# Patient Record
Sex: FEMALE | Race: WHITE
[De-identification: names, ages, dates, MRNs, and addresses within clinical notes are randomized per-mention and may not be internally consistent; named-entity substitution may affect disease eponyms.]

---

## 2017-05-12 NOTE — EDPHY
H & P


Stated Complaint: RLQ abd pain





- Personal History


LMP (Females 10-55): 1-7 Days Ago


Current Tetanus/Diphtheria Vaccine: Yes


Current Tetanus Diphtheria and Acellular Pertussis (TDAP): Yes





- Medical/Surgical History


Hx Asthma: Yes


Hx Chronic Respiratory Disease: No


Hx Diabetes: No


Hx Cardiac Disease: No


Hx Renal Disease: No


Hx Cirrhosis: No


Hx Alcoholism: No


Hx HIV/AIDS: No


Hx Splenectomy or Spleen Trauma: No


Other PMH: asthma,





- Social History


Smoking Status: Never smoked


Time Seen by Provider: 05/11/17 23:43


HPI/ROS: 





Chief complaint:  Abdominal pain





History of present illness:  This is a 42-year-old female who presents to the 

emergency department for evaluation of abdominal pain.  Patient reports the 

onset of symptoms earlier this evening.  She reports pain in the right, upper 

aspect of the abdomen.  It does radiate towards her mid back.  She describes a 

squeezing pain.  She has had nausea but no vomiting.  She denies precipitating 

factors.  She denies alleviating factors.  She denies other associated signs or 

symptoms including no fevers, again no vomiting, no diarrhea, no urinary 

symptoms.





Review of systems:  A 10 point review of systems was obtained and other than 

described above was negative (Won Pelletier)





- Physical Exam


Exam: 





General Appearance:  Alert, nontoxic.


Eyes:  Pupils equal and round no pallor or injection.


ENT, Mouth:  Mucous membranes moist.


Respiratory:  There are no retractions, lungs are clear to auscultation.


Cardiovascular:  Regular rate and rhythm.


Gastrointestinal:  Bowel sounds normal.  Abdomen is soft, nondistended.  There 

is tenderness in the right upper quadrant with positive Clifton sign. No McBurney

's point tenderness.


Neurological:  Alert and oriented. Strength and sensation intact and 

symmetrical.


Skin:  Warm and dry, no rashes.


Musculoskeletal:  Neck is supple nontender. Extremities are symmetrical, full 

range of motion.


Psychiatric:  Patient is oriented X 3, there is no agitation.


 (Won Pelletier)


Constitutional: 


 Initial Vital Signs











Temperature (C)  36.6 C   05/11/17 23:35


 


Heart Rate  72   05/11/17 23:35


 


Respiratory Rate  18   05/11/17 23:35


 


Blood Pressure  131/82 H  05/11/17 23:35


 


O2 Sat (%)  99   05/11/17 23:35








 











O2 Delivery Mode               Room Air














Allergies/Adverse Reactions: 


 





Penicillins Allergy (Severe, Verified 05/11/17 23:34)


 Anaphylaxis








Home Medications: 














 Medication  Instructions  Recorded


 


Albuterol HFA 17g  01/15/11


 


Montelukast Sodium  05/11/17














Medical Decision Making





- Diagnostics


Imaging Results: 





Right upper quadrant ultrasound demonstrates multiple small dependent, mobile 

gallstones, no gallbladder wall thickening, sonographic Clifton's, 

pericholecystic fluid, dilated CBD, discussed with Dr. Coley of Radiology. (

Sunshine Holliday)


ED Course/Re-evaluation: 





Patient seen under the supervision of my secondary supervising physician Dr. Sunshine Byrd.  Patient presents to the emergency department for right 

upper quadrant abdominal pain.  She is tender in this region.  Blood studies 

concerning for mild elevated white blood cell count, mild elevation of LFTs and 

blood in the urine.  Right upper quadrant ultrasound is ordered and is pending 

at time of dictation.  Care of patient is turned over to my attending physician 

Dr. Sunshine Byrd end of shift. (Won Pelletier)


PHYSICIAN DOCUMENTATION:


The patient was evaluated and managed by the Physician Assistant.  My co-

signature indicates that I have reviewed this chart and I agree with the 

findings and plan of care as documented.  I am the secondary supervising 

physician.





I reassessed the patient after her ultrasound.  Her pain has improved 

significantly.  She has not had any vomiting.  On abdominal exam, she does not 

have any tenderness.  I reviewed her test results with her and her ultrasound.  

I explained cholelithiasis and cholecystitis to her.  She does have a 

leukocytosis and a mild transaminitis, however given resolution of her symptoms

, I do not feel she would benefit from inpatient hospitalization.  I have 

offered hospitalization to her, however she would like to go home.  I have 

instructed her to avoid greasy and fatty foods.  I have told her to take 

ibuprofen as needed for pain.  I have referred her to General surgery for 

further evaluation.  She is in agreement with this plan.  She was given a p. o. 

trial without any difficulty.





 (Sunshine Holliday)


Differential Diagnosis: 





Included but not limited to biliary tract disease, pancreatitis, peptic ulcer 

disease, gastroenteritis, appendicitis, urinary tract disease (Won Pelletier)





- Data Points


Laboratory Results: 


 Laboratory Results





 05/12/17 00:01 





 05/12/17 00:01 





 











  05/12/17 05/12/17 05/12/17





  00:01 00:01 00:01


 


WBC      13.08 10^3/uL H 10^3/uL





     (3.80-9.50) 


 


RBC      4.31 10^6/uL 10^6/uL





     (4.18-5.33) 


 


Hgb      12.8 g/dL g/dL





     (12.6-16.3) 


 


Hct      37.0 % L %





     (38.0-47.0) 


 


MCV      85.8 fL fL





     (81.5-99.8) 


 


MCH      29.7 pg pg





     (27.9-34.1) 


 


MCHC      34.6 g/dL g/dL





     (32.4-36.7) 


 


RDW      11.9 % %





     (11.5-15.2) 


 


Plt Count      462 10^3/uL H 10^3/uL





     (150-400) 


 


MPV      8.3 fL L fL





     (8.7-11.7) 


 


Neut % (Auto)      66.2 % %





     (39.3-74.2) 


 


Lymph % (Auto)      22.5 % %





     (15.0-45.0) 


 


Mono % (Auto)      7.5 % %





     (4.5-13.0) 


 


Eos % (Auto)      2.9 % %





     (0.6-7.6) 


 


Baso % (Auto)      0.5 % %





     (0.3-1.7) 


 


Nucleat RBC Rel Count      0.0 % %





     (0.0-0.2) 


 


Absolute Neuts (auto)      8.67 10^3/uL H 10^3/uL





     (1.70-6.50) 


 


Absolute Lymphs (auto)      2.94 10^3/uL 10^3/uL





     (1.00-3.00) 


 


Absolute Monos (auto)      0.98 10^3/uL H 10^3/uL





     (0.30-0.80) 


 


Absolute Eos (auto)      0.38 10^3/uL 10^3/uL





     (0.03-0.40) 


 


Absolute Basos (auto)      0.06 10^3/uL 10^3/uL





     (0.02-0.10) 


 


Absolute Nucleated RBC      0.00 10^3/uL 10^3/uL





     (0-0.01) 


 


Immature Gran %      0.4 % %





     (0.0-1.1) 


 


Immature Gran #      0.05 10^3/uL 10^3/uL





     (0.00-0.10) 


 


Sodium    141 mEq/L mEq/L  





    (134-144)  


 


Potassium    4.1 mEq/L mEq/L  





    (3.5-5.2)  


 


Chloride    106 mEq/L mEq/L  





    ()  


 


Carbon Dioxide    21 mEq/l L mEq/l  





    (22-31)  


 


Anion Gap    14 mEq/L mEq/L  





    (8-16)  


 


BUN    19 mg/dL mg/dL  





    (7-23)  


 


Creatinine    0.9 mg/dL mg/dL  





    (0.6-1.0)  


 


Estimated GFR    > 60   





    


 


Glucose    126 mg/dL H mg/dL  





    ()  


 


Calcium    9.7 mg/dL mg/dL  





    (8.5-10.4)  


 


Total Bilirubin    0.8 mg/dL mg/dL  





    (0.1-1.4)  


 


Conjugated Bilirubin    0.5 mg/dL mg/dL  





    (0.0-0.5)  


 


Unconjugated Bilirubin    0.3 mg/dL mg/dL  





    (0.0-1.1)  


 


AST    93 IU/L H IU/L  





    (14-46)  


 


ALT    63 IU/L H IU/L  





    (9-52)  


 


Alkaline Phosphatase    70 IU/L IU/L  





    ()  


 


Total Protein    7.3 g/dL g/dL  





    (6.3-8.2)  


 


Albumin    4.3 g/dL g/dL  





    (3.5-5.0)  


 


Lipase    101.0 IU/L IU/L  





    ()  


 


Beta HCG, Qual  NEGATIVE     





    











Medications Given: 


 








Discontinued Medications





Hydrocodone Bitart/Acetaminophen (Norco 5/325mg Prepack#6)  1 btl TAKEHOME 

EDNOW ONE


   Stop: 05/12/17 01:25


   Last Admin: 05/12/17 01:28 Dose:  1 btl


Hydromorphone HCl (Dilaudid)  0.5 mg IVP EDNOW ONE


   Stop: 05/11/17 23:52


   Last Admin: 05/12/17 00:09 Dose:  0.5 mg


Ondansetron HCl (Zofran)  4 mg IVP EDNOW ONE


   Stop: 05/11/17 23:52


   Last Admin: 05/12/17 00:09 Dose:  4 mg








Departure





- Departure


Disposition: Home, Routine, Self-Care


Clinical Impression: 


Cholelithiasis


Qualifiers:


 Cholelithiasis location: gallbladder Cholecystitis presence: without 

cholecystitis Biliary obstruction: without biliary obstruction Qualified Code(s)

: K80.20 - Calculus of gallbladder without cholecystitis without obstruction





Condition: Good


Instructions:  Hydrocodone/Acetaminophen (By mouth), Biliary Colic (ED)


Additional Instructions: 


Please avoid any fatty or greasy foods.





Please take the pain medication if your pain returns.  You can start with 

ibuprofen 400 mg every 6 hours, if the pain is more severe, you can take the 

pain pills.  I have given you information for the general surgeon, you should 

call to make an appointment.





If your pain is severe, if you have severe nausea or vomiting, or if you 

develop a fever you must return to the emergency room.


Referrals: 


Bekah Villanueva MD [Primary Care Provider] - As per Instructions


Tripp Weiner MD [Medical Doctor] - As per Instructions

## 2017-05-20 NOTE — GOP
[f rep st]



                                                                OPERATIVE REPORT





DATE OF OPERATION:  05/19/2017



SURGEON:  Tripp Weiner MD



ASSISTANT:  Elliot Gordillo, PAC.



ANESTHESIA:  General endotracheal per Arturo Treadwell M.D.



PREOPERATIVE DIAGNOSIS:  Biliary colic.



POSTOPERATIVE DIAGNOSIS:  Chronic cholecystitis.



PROCEDURE PERFORMED:  Laparoscopic cholecystectomy.



FINDINGS:  Fair amount of wall induration and some adhesions to the gallbladder wall were identified
.  A critical view was obtained prior to clipping both the cystic duct and artery.



SPECIMENS:  Gallbladder.



ESTIMATED BLOOD LOSS:  25 cc.



DESCRIPTION OF PROCEDURE:  The patient was greeted in the preoperative suite.  Once again, risks, be
nefits, and alternatives were discussed.  Consent was then signed.  She was then brought back to the
 operative suite, placed on the OR table in a supine position.  After all anesthesia machines, inclu
ding SCDs, were on and functioning, a World Health [employer] time-out was performed, ending with 
all in agreement.  Antibiotics were given on-call to the operating room. 



After successful induction of general anesthesia, the patient's abdomen was widely prepped and drape
d in typical sterile fashion.  I entered the abdomen via an inferior umbilical incision through whic
h the Veress needle was passed.  I insufflated with CO2 to 15 mmHg [facility] 



Through this same incision I inserted a 12 mm Visiport under direct visualization.  Once successfull
y in the abdomen, I placed 3 additional 5 mm trocars, 1 in the subxiphoid, 2 in the right upper quad
rant, all under direct visualization.  After this was done, I grasped the dome of the liver and retr
acted it successfully over the liver.  There were some peritoneal adhesions which were successfully 
taken down bluntly. 



After this was done, I turned my attention towards the infundibulum, and via a combination of electr
ocautery and blunt dissection, I identified 2 and only 2 structures, leading toward the gallbladder 
proper.  After I identified these to be both the cystic duct and artery, I 1st clipped and divided t
he cystic duct and did the same for the artery.  There was a small arterial branch posterior to this
 which did bleed somewhat after this, which was successfully made hemostatic with an additional clip
. 



After hemostasis was achieved,  I turned my attention towards removing the gallbladder off the liver
 bed which was done with electrocautery.  After successfully doing this, it was placed in an EndoCat
ch bag and removed.  I turned my attention back towards the right upper quadrant.  I irrigated with 
1 L of warm normal saline, removing all blood and bile within the patient's right upper quadrant. 



I then inspected my clips which were noted to be in satisfactory condition.  I then instilled local 
anesthesia into all port sites which were removed under direct visualization.  Prior to doing this, 
I used the Kevyn Alethea fascial closure device to close my infraumbilical incision, noting excell
ent fascial reapproximation with a single 0 Vicryl stitch. 



Ports were then removed and pneumoperitoneum was evacuated.  All skin sites were closed with a runni
ng 4-0 Monocryl stitch over which Dermabond was placed.  The patient tolerated the procedure well wi
thout any intraoperative complications.



DRAINS:  None.



COMPLICATIONS:  None.





Job #:  [id number]

## 2017-05-20 NOTE — GCON
[f 
rep st]



                                                                    CONSULTATION





DATE OF CONSULTATION:  05/20/2017



CHIEF COMPLAINT:  Incisional redness.



HISTORY OF PRESENT ILLNESS:  This is a 42-year-old female, postoperative day 1 
from a laparoscopic cholecystectomy performed by me yesterday as an outpatient.
  The patient states that she went home yesterday evening uneventfully healing 
from her surgery and doing well.  She states that over the night, she had no 
issues, but awoke this morning, and had some redness around her umbilical 
incision.  She does endorse that this got a little bit bigger today, which is 
why she called a nurse line here at [facility], and they prompted her to 
present to the emergency department.  Other than the redness around her 
umbilical incision, she has no other complaints.  She continues to tolerate a 
regular diet.  Has no fevers, chills, and her pain appears well controlled.  
She was just concerned about the redness at that site.



PAST MEDICAL HISTORY:  Asthma.



PAST SURGICAL HISTORY:  Postoperative day 1 from a laparoscopic cholecystectomy.



SOCIAL HISTORY:  Denies drug and alcohol abuse.  Lives in the area with her son.



CURRENT MEDICATIONS:  Percocet inhalers and Zofran.



REVIEW OF SYSTEMS:  A full 10-point review was performed and unless explicitly 
stated above, is otherwise negative.



PHYSICAL EXAM:  VITAL SIGNS:  Blood pressure 130/70, heart rate 88, and she is 
94% on room air.  Her temperature is 36.7.  GENERAL:  She is alert and oriented
, in no acute distress.  CV:  She has a regular rate and rhythm, without any 
murmurs.  LUNGS:  Clear.  ABDOMEN:  Soft.  Incision sites are clean, dry and 
intact.  The umbilical site does have what appears to be about a 2-cm 
circumferential area of bruising around it.  It does not appear to be infectious
, it does not nito to touch, and it is relatively minimal tender.  The glue 
placed intraoperatively is healing appropriately.  She does also have some 
stranding on her right side of her abdomen in the inferior portion, which was 
out of the operative field, which is also red and indurated.



ASSESSMENT AND PLAN:  A 42-year-old female, status post laparoscopic 
cholecystectomy, postoperative day 1.  I do agree with Dr. Rai's impression 
in the emergency department that this could represent an early cellulitis, but 
I feel more likely that this represents a bruise related to the trauma of 
surgery at the port site.  I anticipate that this will resolve.  She has no 
systemic signs of infection, but it sounds like she would be reassured on a 
short course of oral antibiotics.  I have asked the emergency department to 
provide these.  I will have her follow up in the clinic next week to ensure 
that she is continuing to heal appropriately. 







Job #:  [id number]

MTDD

## 2017-05-20 NOTE — EDPHY
H & P


Stated Complaint: incision from gallbladder surgery red, spreading and draining


HPI/ROS: 





HPI





CHIEF COMPLAINT:  Possible gallbladder incision infection





HISTORY OF PRESENT ILLNESS:  This patient very pleasant 42-year-old female she 

had gallbladder surgery yesterday.  Dr. Weiner with our took her 

gallbladder out.  She presents emergency room with questioning concerned about 

her umbilicus incision concern of redness pain and swelling. She is concerned 

may be infected.  She denies fever, denies significant abdominal pain states 

she is eating and drinking okay.  No carmen pus.  





Past Medical History:  No significant medical history except for asthma





Past Surgical History:  Laparoscopic cholecystectomy yesterday





Social History:  Denies drugs alcohol tobacco products





Family History:  Noncontributory








ROS   


REVIEW OF SYSTEMS:


A comprehensive 10 point review of systems is otherwise negative aside from 

elements mentioned in the history of present illness.








Exam   


Constitutional   triage nursing summary reviewed, vital signs reviewed, awake/

alert. 


Eyes   normal conjunctivae and sclera, EOMI, PERRLA. 


HENT   normal inspection, atraumatic, moist mucus membranes, no epistaxis, neck 

supple/ no meningismus, no raccoon eyes. 


Respiratory   clear to auscultation bilaterally, normal breath sounds, no 

respiratory distress, no wheezing. 


Cardiovascular   rate normal, regular rhythm, no murmur, no edema, distal 

pulses normal. 


Gastrointestinal  laparoscopic incision sites:  Look good, clean, intact, the 

umbilical incision site is mildly erythematous proximal to the umbilical 

incision site there is some streaking, minimal warmth no carmen pus, no guarding

, normal bowel sounds, no distension, no pulsatile mass. 


Genitourinary   no CVA tenderness. 


Musculoskeletal  no midline vertebral tenderness, full range of motion, no calf 

swelling, no tenderness of extremities, no meningismus, good pulses, 

neurovascularly intact.


Skin   pink, warm, & dry, no rash, skin atraumatic. 


Neurologic   awake, alert and oriented x 3, AAOx3, moves all 4 extremities 

equally, motor intact, sensory intact, CN II-XII intact, normal cerebellar, 

normal vision, normal speech. 


Psychiatric   normal mood/affect. 


Heme/Lymph/Immune   no lymphadenopathy.





Differential Diagnosis:  Includes but is not limited to in a particular order 

laparoscopic incision infection, abdominal wall cellulitis, postoperative 

inflammatory changes





Medical Decision Making:  Plan for this patient Bactrim p. o. here in emergency 

Keflex p. o. here in emergency room.  I will consult surgery Dr. Weiner is 

actually on-call for this and this is his patient I will ask that he comes and 

sees the patient emergency room.





Re-evaluation:











Source: Patient





- Personal History


LMP (Females 10-55): 15-21 Days Ago


Current Tetanus/Diphtheria Vaccine: Yes


Current Tetanus Diphtheria and Acellular Pertussis (TDAP): Yes


Tetanus Vaccine Date: <10 years





- Medical/Surgical History


Hx Asthma: Yes


Hx Chronic Respiratory Disease: No


Hx Diabetes: No


Hx Cardiac Disease: No


Hx Renal Disease: No


Hx Cirrhosis: No


Hx Alcoholism: No


Hx HIV/AIDS: No


Hx Splenectomy or Spleen Trauma: No


Other PMH: asthma, gallbladder removed 5/19





- Social History


Smoking Status: Never smoked


Constitutional: 


 Initial Vital Signs











Temperature (C)  36.7 C   05/20/17 21:19


 


Heart Rate  70   05/20/17 21:19


 


Respiratory Rate  18   05/20/17 21:19


 


O2 Sat (%)  96   05/20/17 21:19








 











O2 Delivery Mode               Room Air














Allergies/Adverse Reactions: 


 





Penicillins Allergy (Severe, Verified 05/18/17 15:56)


 Anaphylaxis








Home Medications: 














 Medication  Instructions  Recorded


 


Albuterol HFA 17g  01/15/11


 


Montelukast Sodium  05/11/17


 


Fluticasone Nasal  05/18/17


 


Herbals/Supplements -Info Only  05/18/17


 


Cephalexin [Keflex] 500 mg PO Q6H #28 cap 05/20/17


 


Sulfamethox/Tmp 800/160 mg 1 tab PO BID@1000,2200 #14 tab 05/20/17





[Bactrim Ds]  














Departure





- Departure


Disposition: Home, Routine, Self-Care


Clinical Impression: 


 Abdominal wall cellulitis





Condition: Good


Instructions:  Cellulitis (ED)


Additional Instructions: 


1. Watch closely for further signs of worsening infection this includes redness

, swelling, pain or fever.


2. Please take antibiotics as prescribed.


3. Return emergency room if you have any worsening symptoms questions or 

concerns.


Referrals: 


NONE *PRIMARY CARE P,. [Primary Care Provider] - As per Instructions


Tripp Weiner MD [Medical Doctor] - As per Instructions


Prescriptions: 


Cephalexin [Keflex] 500 mg PO Q6H #28 cap


Sulfamethox/Tmp 800/160 mg [Bactrim Ds] 1 tab PO BID@1000,2200 #14 tab

## 2018-09-27 ENCOUNTER — HOSPITAL ENCOUNTER (OUTPATIENT)
Dept: HOSPITAL 80 - FIMAGING | Age: 43
End: 2018-09-27
Attending: NURSE PRACTITIONER
Payer: COMMERCIAL

## 2018-09-27 DIAGNOSIS — Z12.31: Primary | ICD-10-CM
